# Patient Record
Sex: MALE | Race: WHITE
[De-identification: names, ages, dates, MRNs, and addresses within clinical notes are randomized per-mention and may not be internally consistent; named-entity substitution may affect disease eponyms.]

---

## 2019-01-18 ENCOUNTER — HOSPITAL ENCOUNTER (EMERGENCY)
Dept: HOSPITAL 56 - MW.ED | Age: 34
Discharge: HOME | End: 2019-01-18
Payer: COMMERCIAL

## 2019-01-18 VITALS — SYSTOLIC BLOOD PRESSURE: 113 MMHG | DIASTOLIC BLOOD PRESSURE: 73 MMHG

## 2019-01-18 DIAGNOSIS — F17.210: ICD-10-CM

## 2019-01-18 DIAGNOSIS — Z23: ICD-10-CM

## 2019-01-18 DIAGNOSIS — S61.210A: Primary | ICD-10-CM
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
## 2019-01-18 NOTE — EDM.PDOC
ED HPI GENERAL MEDICAL PROBLEM





- General


Chief Complaint: Laceration


Stated Complaint: CUT ON RIGHT HAND FINGER


Time Seen by Provider: 01/18/19 08:02





- History of Present Illness


INITIAL COMMENTS - FREE TEXT/NARRATIVE: 


HISTORY AND PHYSICAL:





History of present illness:


Patient is a 34-year-old white male who presents with her laceration second 

digit of his right hand that occurred at work he denies up-to-date tetanus 

there is no other trauma or concern





Review of systems: 


As per history of present illness and below otherwise all systems reviewed and 

negative.





Past medical history: 


As per history of present illness and as reviewed below otherwise 

noncontributory.





Surgical history: 


As per history of present illness and as reviewed below otherwise 

noncontributory.





Social history: 


No reported history of drug or alcohol abuse.





Family history: 


As per history of present illness and as reviewed below otherwise 

noncontributory.





Physical exam:


HEENT: Atraumatic, normocephalic, pupils reactive, negative for conjunctival 

pallor or scleral icterus, mucous membranes moist, throat clear, neck supple, 

nontender, trachea midline.


Lungs: Clear to auscultation, breath sounds equal bilaterally, chest nontender.


Heart: S1S2, regular, negative for clicks, rubs, or JVD.


Abdomen: Soft, nondistended, nontender. Negative for masses or 

hepatosplenomegaly. Negative for costovertebral tenderness.


Pelvis: Stable nontender.


Genitourinary: Deferred.


Rectal: Deferred.


Extremities: Patient has approximately 2 cm moderate Laceration over the dorsal 

aspect of the mid second digit neurovascular exam CMS unremarkable is no tendon 

involvement.


Neuro: Awake, alert, oriented. Cranial nerves II through XII unremarkable. 

Cerebellum unremarkable. Motor and sensory unremarkable throughout. Exam 

nonfocal.





Diagnostics:


None





Therapeutics:


Tetanus was updated patient was anesthetized 1% lidocaine without epinephrine 

irrigated smuts 0.9 normal saline prepped and draped in sterile manner and 

closed with 4-0 nylon interrupted suture





Impression: 


#1 right hand injury (laceration)





Definitive disposition and diagnosis as appropriate pending reevaluation and 

review of above.





  ** Right index finger


Pain Score (Numeric/FACES): 10





- Related Data


 Allergies











Allergy/AdvReac Type Severity Reaction Status Date / Time


 


No Known Allergies Allergy   Verified 01/18/19 07:34











Home Meds: 


 Home Meds





. [No Known Home Meds]  05/25/15 [History]











Past Medical History





- Past Health History


Medical/Surgical History: Denies Medical/Surgical History





Social & Family History





- Family History


Family Medical History: Noncontributory





- Tobacco Use


Smoking Status *Q: Current Every Day Smoker


Years of Tobacco use: 10


Packs/Tins Daily: 0.5





- Caffeine Use


Caffeine Use: Reports: Coffee





- Recreational Drug Use


Recreational Drug Use: Yes


Recreational Drug Type: Reports: Marijuana/Hashish


Recreational Drug Use Frequency: Monthly





ED ROS GENERAL





- Review of Systems


Review Of Systems: ROS reveals no pertinent complaints other than HPI.





ED EXAM, SKIN/RASH


Exam: See Below (See dictation)





Course





- Vital Signs


Last Recorded V/S: 


 Last Vital Signs











Temp  35.7 C   01/18/19 07:36


 


Pulse  88   01/18/19 07:49


 


Resp  16   01/18/19 07:49


 


BP  116/77   01/18/19 07:49


 


Pulse Ox  97   01/18/19 07:49














- Orders/Labs/Meds


Orders: 


 Active Orders 24 hr











 Category Date Time Status


 


 Vaccines to be Administered [RC] PER UNIT ROUTINE Care  01/18/19 07:39 Active











Meds: 


Medications














Discontinued Medications














Generic Name Dose Route Start Last Admin





  Trade Name Freq  PRN Reason Stop Dose Admin


 


Diphtheria/Tetanus/Acell Pertussis  0.5 ml  01/18/19 07:38  01/18/19 07:43





  Adacel  IM  01/18/19 07:39  0.5 ml





  .ONCE ONE   Administration





     





     





     





     


 


Diphtheria/Tetanus/Acell Pertussis  Confirm  01/18/19 07:40  01/18/19 07:44





  Adacel  Administered  01/18/19 07:41  Not Given





  Dose   





  0.5 ml   





  .ROUTE   





  .STK-MED ONE   





     





     





     





     


 


Lidocaine HCl  Confirm  01/18/19 07:40  01/18/19 07:45





  Xylocaine-Mpf 1%  Administered  01/18/19 07:41  Not Given





  Dose   





  5 mls @ as directed   





  .ROUTE   





  .STK-MED ONE   





     





     





     





     


 


Lidocaine HCl  5 ml  01/18/19 07:38  01/18/19 07:45





  Xylocaine-Mpf 1%  INJECT  01/18/19 07:39  5 ml





  ONETIME ONE   Administration





     





     





     





     














Departure





- Departure


Time of Disposition: 07:44


Disposition: Home, Self-Care 01


Condition: Good


Clinical Impression: 


 Hand injury, Laceration








- Discharge Information


Referrals: 


PCP,None [Primary Care Provider] - 


Forms:  ED Department Discharge


Additional Instructions: 


The following information is given to patients seen in the emergency department 

who are being discharged to home. This information is to outline your options 

for follow-up care. We provide all patients seen in our emergency department 

with a follow-up referral.





The need for follow-up, as well as the timing and circumstances, are variable 

depending upon the specifics of your emergency department visit.





If you don't have a primary care physician on staff, we will provide you with a 

referral. We always advise you to contact your personal physician following an 

emergency department visit to inform them of the circumstance of the visit and 

for follow-up with them and/or the need for any referrals to a consulting 

specialist.





The emergency department will also refer you to a specialist when appropriate. 

This referral assures that you have the opportunity for followup care with a 

specialist. All of these measure are taken in an effort to provide you with 

optimal care, which includes your followup.





Under all circumstances we always encourage you to contact your private 

physician who remains a resource for coordinating  your care. When calling for 

followup care, please make the office aware that this follow-up is from your 

recent emergency room visit. If for any reason you are refused follow-up, 

please contact the [facility] emergency department at [phone] 

and asked to speak to the emergency department charge nurse.














Keflex as prescribed Wound care as directed suture removal 10-14 days follow-up 

occupational medicine return as needed as discussed





- My Orders


Last 24 Hours: 


My Active Orders





01/18/19 07:39


Vaccines to be Administered [RC] PER UNIT ROUTINE 














- Assessment/Plan


Last 24 Hours: 


My Active Orders





01/18/19 07:39


Vaccines to be Administered [RC] PER UNIT ROUTINE

## 2019-08-17 ENCOUNTER — HOSPITAL ENCOUNTER (EMERGENCY)
Dept: HOSPITAL 56 - MW.ED | Age: 34
Discharge: HOME | End: 2019-08-17
Payer: COMMERCIAL

## 2019-08-17 VITALS — DIASTOLIC BLOOD PRESSURE: 73 MMHG | SYSTOLIC BLOOD PRESSURE: 122 MMHG

## 2019-08-17 DIAGNOSIS — R10.13: Primary | ICD-10-CM

## 2019-08-17 DIAGNOSIS — R10.30: ICD-10-CM

## 2019-08-17 LAB
CHLORIDE SERPL-SCNC: 103 MMOL/L (ref 98–107)
SODIUM SERPL-SCNC: 139 MMOL/L (ref 136–148)

## 2019-08-17 NOTE — EDM.PDOC
ED HPI GENERAL MEDICAL PROBLEM





- General


Chief Complaint: Abdominal Pain


Stated Complaint: STOMACH PAIN


Time Seen by Provider: 08/17/19 10:06


Source of Information: Reports: Patient


History Limitations: Reports: No Limitations





- History of Present Illness


INITIAL COMMENTS - FREE TEXT/NARRATIVE: 





HISTORY AND PHYSICAL:





History of present illness:


Patient is a 34-year-old male presents to the ED with complaint abdominal pain 

x 4 days. He states it's gotten worse today. He reports it is a constant pain 

with intermittent cramping. He has been having diarrhea today. He denies fevers

, chills, nausea, vomiting, melena, hematochezia, urinary symptoms, chest pain, 

SOB, headache. Denies surgical history or significant past medical history. 

Smokes 2pp week x 15 years. Denies alcohol or illicit drug use. 





Review of systems: 


As per history of present illness and below otherwise all systems reviewed and 

negative.





Past medical history: 


As per history of present illness and as reviewed below otherwise 

noncontributory.





Surgical history: 


As per history of present illness and as reviewed below otherwise 

noncontributory.





Social history: 


No reported history of drug or alcohol abuse.





Family history: 


As per history of present illness and as reviewed below otherwise 

noncontributory.





Physical exam:


General: Patient sitting comfortably in no acute distress and nontoxic appearing


HEENT: Atraumatic, normocephalic, pupils reactive, negative for conjunctival 

pallor or scleral icterus, mucous membranes moist, throat clear, neck supple, 

nontender, trachea midline. No meningeal signs. 


Lungs: Clear to auscultation, breath sounds equal bilaterally, chest nontender.


Heart: S1S2, regular, negative for clicks, rubs, or overt murmur.


Abdomen:  Mild diffuse abdominal tenderness. Hyperactive bowel sounds. Soft, 

nondistended. Negative for masses or hepatosplenomegaly. Negative for 

costovertebral tenderness. No rigidity, rebound, guarding.


Pelvis: Stable nontender.


Genitourinary: Deferred.


Rectal: Deferred.


Extremities: Atraumatic, negative for cords or calf pain. Neurovascular 

unremarkable.


Neuro: Awake, alert, oriented. Cranial nerves II through XII unremarkable. 

Cerebellum unremarkable. Motor and sensory unremarkable throughout. Exam 

nonfocal.





Notes: 





Diagnostics:


CBC, CMP, UA, lipase





Therapeutics:








Prescriptions:








Impression: 


abdominal pain 





Plan:


Drink plenty of fluids and bland food as tolerated


Follow up with primary care provider


Return to eD as needed as discussed 





Definitive disposition and diagnosis as appropriate pending reevaluation and 

review of above.





  ** Right Epigastric


Pain Score (Numeric/FACES): 10





  ** lower quadrant


Pain Score (Numeric/FACES): 9





- Related Data


 Allergies











Allergy/AdvReac Type Severity Reaction Status Date / Time


 


No Known Allergies Allergy   Verified 08/17/19 10:00











Home Meds: 


 Home Meds





. [No Known Home Meds]  08/17/19 [History]











Past Medical History





- Past Health History


Medical/Surgical History: Denies Medical/Surgical History





Social & Family History





- Family History


Family Medical History: Noncontributory





- Caffeine Use


Caffeine Use: Reports: Coffee





ED ROS GENERAL





- Review of Systems


Review Of Systems: ROS reveals no pertinent complaints other than HPI.





ED EXAM, GI/ABD





- Physical Exam


Exam: See Below (see dictation)





Course





- Vital Signs


Last Recorded V/S: 


 Last Vital Signs











Temp  97.8 F   08/17/19 09:59


 


Pulse  55 L  08/17/19 09:59


 


Resp  16   08/17/19 09:59


 


BP  122/73   08/17/19 09:59


 


Pulse Ox  100   08/17/19 09:59














- Orders/Labs/Meds


Labs: 


 Laboratory Tests











  08/17/19 08/17/19 08/17/19 Range/Units





  10:48 10:48 11:30 


 


WBC  8.39    (4.0-11.0)  K/uL


 


RBC  4.86    (4.50-5.90)  M/uL


 


Hgb  14.8    (13.0-17.0)  g/dL


 


Hct  42.9    (38.0-50.0)  %


 


MCV  88.3    (80.0-98.0)  fL


 


MCH  30.5    (27.0-32.0)  pg


 


MCHC  34.5    (31.0-37.0)  g/dL


 


RDW Std Deviation  38.6    (28.0-62.0)  fl


 


RDW Coeff of Saira  12    (11.0-15.0)  %


 


Plt Count  227    (150-400)  K/uL


 


MPV  8.90    (7.40-12.00)  fL


 


Neut % (Auto)  74.2    (48.0-80.0)  %


 


Lymph % (Auto)  18.0    (16.0-40.0)  %


 


Mono % (Auto)  7.0    (0.0-15.0)  %


 


Eos % (Auto)  0.6    (0.0-7.0)  %


 


Baso % (Auto)  0.2    (0.0-1.5)  %


 


Neut # (Auto)  6.2 H    (1.4-5.7)  K/uL


 


Lymph # (Auto)  1.5    (0.6-2.4)  K/uL


 


Mono # (Auto)  0.6    (0.0-0.8)  K/uL


 


Eos # (Auto)  0.1    (0.0-0.7)  K/uL


 


Baso # (Auto)  0.0    (0.0-0.1)  K/uL


 


Nucleated RBC %  0.0    /100WBC


 


Nucleated RBCs #  0    K/uL


 


Sodium   139   (136-148)  mmol/L


 


Potassium   3.8   (3.5-5.1)  mmol/L


 


Chloride   103   ()  mmol/L


 


Carbon Dioxide   29.8   (21.0-32.0)  mmol/L


 


BUN   8   (7.0-18.0)  mg/dL


 


Creatinine   0.9   (0.8-1.3)  mg/dL


 


Est Cr Clr Drug Dosing   128.23   mL/min


 


Estimated GFR (MDRD)   > 60.0   ml/min


 


Glucose   93   ()  mg/dL


 


Calcium   8.5   (8.5-10.1)  mg/dL


 


Total Bilirubin   0.4   (0.2-1.0)  mg/dL


 


AST   17   (15-37)  IU/L


 


ALT   39   (14-63)  IU/L


 


Alkaline Phosphatase   58   ()  U/L


 


Total Protein   6.9   (6.4-8.2)  g/dL


 


Albumin   3.2 L   (3.4-5.0)  g/dL


 


Globulin   3.7   (2.6-4.0)  g/dL


 


Albumin/Globulin Ratio   0.9   (0.9-1.6)  


 


Lipase   114   ()  U/L


 


Urine Color    YELLOW  


 


Urine Appearance    CLEAR  


 


Urine pH    6.0  (5.0-8.0)  


 


Ur Specific Gravity    1.020  (1.001-1.035)  


 


Urine Protein    NEGATIVE  (NEGATIVE)  mg/dL


 


Urine Glucose (UA)    NEGATIVE  (NEGATIVE)  mg/dL


 


Urine Ketones    NEGATIVE  (NEGATIVE)  mg/dL


 


Urine Occult Blood    NEGATIVE  (NEGATIVE)  


 


Urine Nitrite    NEGATIVE  (NEGATIVE)  


 


Urine Bilirubin    NEGATIVE  (NEGATIVE)  


 


Urine Urobilinogen    0.2  (<2.0)  EU/dL


 


Ur Leukocyte Esterase    NEGATIVE  (NEGATIVE)  











Meds: 


Medications














Discontinued Medications














Generic Name Dose Route Start Last Admin





  Trade Name Escobar  PRN Reason Stop Dose Admin


 


Ketorolac Tromethamine  60 mg  08/17/19 10:07  08/17/19 10:33





  Toradol  IM  08/17/19 10:08  Not Given





  ONETIME ONE   





     





     





     





     














Departure





- Departure


Time of Disposition: 11:45


Disposition: Home, Self-Care 01


Condition: Good


Clinical Impression: 


 Abdominal pain








- Discharge Information


Referrals: 


PCP,Unknown [Primary Care Provider] - 


Forms:  ED Department Discharge


Additional Instructions: 


The following information is given to patients seen in the emergency department 

who are being discharged to home. This information is to outline your options 

for follow-up care. We provide all patients seen in our emergency department 

with a follow-up referral.





The need for follow-up, as well as the timing and circumstances, are variable 

depending upon the specifics of your emergency department visit.





If you don't have a primary care physician on staff, we will provide you with a 

referral. We always advise you to contact your personal physician following an 

emergency department visit to inform them of the circumstance of the visit and 

for follow-up with them and/or the need for any referrals to a consulting 

specialist.





The emergency department will also refer you to a specialist when appropriate. 

This referral assures that you have the opportunity for follow-up care with a 

specialist. All of these measure are taken in an effort to provide you with 

optimal care, which includes your follow-up.





Under all circumstances we always encourage you to contact your private 

physician who remains a resource for coordinating your care. When calling for 

follow-up care, please make the office aware that this follow-up is from your 

recent emergency room visit. If for any reason you are refused follow-up, 

please contact the [facility] Emergency 

Department at [phone] and asked to speak to the emergency department 

charge nurse.





[facility]


Primary Care


[address] [place] [id number]


Phone: [phone]


Fax: [phone]





[address] [place]


[place] [address]


Phone: [phone]


Fax: [phone]











Drink plenty of fluids and bland food as tolerated


Follow up with primary care provider


Return to ED as needed as discussed

## 2021-12-10 ENCOUNTER — HOSPITAL ENCOUNTER (EMERGENCY)
Dept: HOSPITAL 56 - MW.ED | Age: 36
Discharge: HOME | End: 2021-12-10
Payer: COMMERCIAL

## 2021-12-10 VITALS — HEART RATE: 76 BPM | DIASTOLIC BLOOD PRESSURE: 87 MMHG | SYSTOLIC BLOOD PRESSURE: 135 MMHG

## 2021-12-10 DIAGNOSIS — Z72.0: ICD-10-CM

## 2021-12-10 DIAGNOSIS — L02.411: Primary | ICD-10-CM

## 2021-12-10 PROCEDURE — 99283 EMERGENCY DEPT VISIT LOW MDM: CPT

## 2021-12-10 PROCEDURE — 10060 I&D ABSCESS SIMPLE/SINGLE: CPT

## 2021-12-10 NOTE — EDM.PDOC
ED HPI GENERAL MEDICAL PROBLEM





- General


Chief Complaint: Skin Complaint


Stated Complaint: RIGHT ARMPIT ABCESS


Time Seen by Provider: 12/10/21 14:51


Source of Information: Reports: Patient


History Limitations: Reports: No Limitations





- History of Present Illness


INITIAL COMMENTS - FREE TEXT/NARRATIVE: 


HISTORY AND PHYSICAL:





History of present illness:


Patient is a 36-year-old male who presents to the emergency room with complaints

of an abscess in the right axilla that is getting larger.  4 days ago and took a

few doses of Augmentin that he had left over.  When this did not work he went to

the walk-in clinic and was given Bactrim DS.  He has taken this medication for 2

days.  He feels that the abscess needs to be drained as it feels more fluctuant 

and is tender with any movement of the upper extremity. Patient denies any 

fever, chills, headache, change in vision, syncope or near syncope. Denies any 

chest pain, back pain, shortness of breath or cough. Denies any GI or  

symptoms. Patient has been eating and drinking appropriately. No recent travel 

or sick contacts. Tdap is UTD.





Review of systems: 


As per history of present illness and below otherwise all systems reviewed and 

negative.





Past medical history: 


As per history of present illness and as reviewed below otherwise 

noncontributory.





Surgical history: 


As per history of present illness and as reviewed below otherwise 

noncontributory.





Social history: 


See social history for further information





Family history: 


As per history of present illness and as reviewed below otherwise 

noncontributory.





Physical exam:


General: Well developed and well nourished. Alert and orientated x 3. Nontoxic 

in appearance and in no acute distress. Vital signs are stable and have been 

reviewed by me. Nursing notes were reviewed. 


HEENT: Atraumatic, normocephalic, pupils equal and reactive bilaterally, 

negative for conjunctival pallor or scleral icterus, mucous membranes moist, TMs

normal bilaterally, throat clear, neck supple, nontender, trachea midline. No 

drooling or trismus noted. No meningeal signs. No hot potato voice noted. 


Lungs: Clear to auscultation bilaterally. No wheezes, rales, or rhonchi. Chest 

nontender. Normal work of breathing, no accessory muscles used.


Heart: S1S2, regular rate and rhythm without overt murmur, gallops, or rubs. No 

JVD. No peripheral edema


Abdomen: Soft, nondistended, nontender. Normoactive bowel sounds. Negative for 

masses or costovertebral tenderness.


Pelvis: Stable nontender.


Genitourinary/Rectal: Deferred.


Skin: Erythema with 2 localized abscesses in the right axilla, each 

approximately the size of a quarter with fluctuance.  Remaining skin is intact, 

warm, dry. No lesions or rashes noted.


Hematologic: No petechiae or purpra. Mucosa appropriate color and normal nail 

bed color and refill.


Extremities: Atraumatic, moves all extremities per self without difficulty or 

deficits, negative for cords or calf pain. Neurovascular unremarkable.


Neuro: Awake, alert, oriented. Cranial nerves II through XII unremarkable. 

Cerebellum unremarkable. Motor and sensory unremarkable throughout. Exam 

nonfocal.


Psychiatric: Mood and affect are appropriate.  Normal thought process. Answering

questions appropriately.





Please note that the patient was seen and evaluated during the 2020 SARS-CoV-2 

novel coronavirus pandemic period.  Community viral transmission is ongoing at 

time of this encounter and the emergency department is operating under pandemic 

response procedures.





Medical Decision Making:


Patient does have an abscess which he was being treated with Bactrim DS x2 days.

 He is concerned that the area needs to be I&D. Diffuse erythema with 2 

localized abscesses in the right axilla, each approximately the size of a 

quarter with fluctuance.  Remaining skin is unaffected.  Area was prepped with 

iodine and 1% lidocaine was used to anesthetize the areas.  An 11 blade was used

to I&D the 2 sites with copious amounts of purulent drainage expressed from both

sites.  I was able to do 1/4 inch iodoform packing.  Patient tolerated fair. I 

have talked with the patient about today's findings, in addition to providing 

specific details for plan of care.  Patient will return in 2 days for packing 

removal and reevaluation.  We will have patient continue to take the Bactrim DS 

that he is already prescribed.  Reassessment at the time of disposition 

demonstrates that the patient is in no acute distress.  The patient is stable 

for discharge, counseling was provided and we discussed in great detail signs 

and symptoms that would prompt them to return to the Emergency Department. 

Medication, follow up and supportive care measures were reviewed and discussed. 

Voices understanding and is agreeable to plan of care. Denies any further 

questions or concerns at this time.





Diagnostics:


None





Therapeutics:


I&D, bacitracin, lidocaine





Prescription:


None





Impression: 


Abscess





Plan:


1.  You were evaluated today on an emergent basis. Your abscess was drained and 

packed with iodoform. Please keep the area clean and dry. Packing should be 

removed in 2 days. 


2. Keep taking the Bactrim DS as directed. You can alternate Tylenol and 

ibuprofen as needed for pain and fever management.


3. We encourage you to follow up with your primary care provider and/or 

recommended specialist in the next few days for re-evaluation and further 

care/management. 


4. If your symptoms should worsen, new symptoms develop or any of the signs and 

symptoms we discussed should arise please return to the emergency room or call 

911 (if needed).





Definitive disposition and diagnosis as appropriate pending reevaluation and 

review of above.





  ** R axillary


Pain Score (Numeric/FACES): 8





- Related Data


                                    Allergies











Allergy/AdvReac Type Severity Reaction Status Date / Time


 


No Known Allergies Allergy   Verified 12/10/21 15:01











Home Meds: 


                                    Home Meds





. [No Known Home Meds]  08/17/19 [History]











Past Medical History





- Past Health History


Medical/Surgical History: Denies Medical/Surgical History


HEENT History: Reports: None


Cardiovascular History: Reports: None


Respiratory History: Reports: None


Gastrointestinal History: Reports: None


Genitourinary History: Reports: None


Musculoskeletal History: Reports: Fracture


Neurological History: Reports: None


Psychiatric History: Reports: None


Endocrine/Metabolic History: Reports: None


Hematologic History: Reports: None


Immunologic History: Reports: None


Oncologic (Cancer) History: Reports: None


Dermatologic History: Reports: None





- Infectious Disease History


Infectious Disease History: Reports: Chicken Pox





- Past Surgical History


Head Surgeries/Procedures: Reports: None


Musculoskeletal Surgical History: Reports: None





Social & Family History





- Family History


Family Medical History: No Pertinent Family History





- Tobacco Use


Tobacco Use Status *Q: Current Every Day Tobacco User


Years of Tobacco use: 20


Packs/Tins Daily: 0.5





- Caffeine Use


Caffeine Use: Reports: Coffee





- Recreational Drug Use


Recreational Drug Use: No





ED ROS GENERAL





- Review of Systems


Review Of Systems: Comprehensive ROS is negative, except as noted in HPI.





ED EXAM, SKIN/RASH


Exam: See Below (See dictation)





ED SKIN PROCEDURES





- I&D


Site: Right axilla


Skin Prep: Providone-Iodine (Betadine), Saline, Sterile Drape


Local Anesthesia: Lidocaine: 1% Plain


Local Anesthetic Volume: 4cc


Area Incised With: 11 Blade


Drainage: Purulent, Moderate Amount


Probed to Break Up Loculations: Yes


Packed With: 1/4 in. Iodoform


Sterile Dressing: 4x4(s)


Complications: No





Course





- Vital Signs


Last Recorded V/S: 


                                Last Vital Signs











Temp  97.4 F   12/10/21 15:01


 


Pulse  103 H  12/10/21 15:01


 


Resp  15   12/10/21 15:01


 


BP  119/86   12/10/21 15:01


 


Pulse Ox  100   12/10/21 15:01














- Orders/Labs/Meds


Meds: 


Medications














Discontinued Medications














Generic Name Dose Route Start Last Admin





  Trade Name Freq  PRN Reason Stop Dose Admin


 


Hydrocodone Bitart/Acetaminophen  1 tab  12/10/21 15:36  12/10/21 15:44





  Acetaminophen/Hydrocodone 325-5 Mg Tab  PO  12/10/21 15:37  1 tab





  ONETIME ONE   Administration


 


Bacitracin  1 dose  12/10/21 15:17  12/10/21 15:26





  Bacitracin Oint 1 Gm U/D Packet  TOP  12/10/21 15:18  1 dose





  ONETIME ONE   Administration


 


Lidocaine HCl  4 ml  12/10/21 15:17  12/10/21 15:26





  Lidocaine 1% Pf 2 Ml Sdv  INJECT  12/10/21 15:18  4 ml





  ONETIME ONE   Administration


 


Ondansetron HCl  4 mg  12/10/21 15:37  12/10/21 15:44





  Ondansetron 4 Mg Tab.Dis  PO  12/10/21 15:38  4 mg





  ONETIME ONE   Administration














Departure





- Departure


Time of Disposition: 15:43


Disposition: Home, Self-Care 01


Clinical Impression: 


 Abscess








- Discharge Information


Instructions:  Skin Abscess, Easy-to-Read


Forms:  ED Department Discharge


Additional Instructions: 


The following information is given to patients seen in the emergency department 

who are being discharged to home. This information is to outline your options 

for follow-up care. We provide all patients seen in our emergency department 

with a follow-up referral.





The need for follow-up, as well as the timing and circumstances, are variable 

depending upon the specifics of your emergency department visit.





If you don't have a primary care physician on staff, we will provide you with a 

referral. We always advise you to contact your personal physician following an 

emergency department visit to inform them of the circumstance of the visit and 

for follow-up with them and/or the need for any referrals to a consulting 

specialist.





The emergency department will also refer you to a specialist when appropriate. 

This referral assures that you have the opportunity for follow-up care with a 

specialist. All of these measure are taken in an effort to provide you with 

optimal care, which includes your follow-up.





Under all circumstances we always encourage you to contact your private 

physician who remains a resource for coordinating your care. When calling for 

follow-up care, please make the office aware that this follow-up is from your 

recent emergency room visit. If for any reason you are refused follow-up, please

 contact the [facility] Emergency 

Department at [phone] and asked to speak to the emergency department 

charge nurse.





[facility]


Primary Care


[address] [place] [id number]


Phone: [phone]


Fax: [phone]





[address] [address]


Phone: [phone]


Fax: [phone]





Thank you for choosing the [facility] emergency department in 

[place] for your medical needs today.  It was a pleasure caring for you. Today

 you were seen in the emergency department for axillary abscess





1.  You were evaluated today on an emergent basis. Your abscess was drained and 

packed with iodoform. Please keep the area clean and dry. Packing should be 

removed in 2 days. 


2. Keep taking the Bactrim DS as directed. You can alternate Tylenol and 

ibuprofen as needed for pain and fever management.


3. We encourage you to follow up with your primary care provider and/or recom

mended specialist in the next few days for re-evaluation and further 

care/management. 


4. If your symptoms should worsen, new symptoms develop or any of the signs and 

symptoms we discussed should arise please return to the emergency room or call 

911 (if needed).





Sepsis Event Note (ED)





- Evaluation


Sepsis Screening Result: No Definite Risk





- Focused Exam


Vital Signs: 


                                   Vital Signs











  Temp Pulse Resp BP Pulse Ox


 


 12/10/21 15:01  97.4 F  103 H  15  119/86  100